# Patient Record
Sex: FEMALE | Race: BLACK OR AFRICAN AMERICAN | NOT HISPANIC OR LATINO | Employment: FULL TIME | URBAN - METROPOLITAN AREA
[De-identification: names, ages, dates, MRNs, and addresses within clinical notes are randomized per-mention and may not be internally consistent; named-entity substitution may affect disease eponyms.]

---

## 2018-11-24 ENCOUNTER — HOSPITAL ENCOUNTER (EMERGENCY)
Facility: HOSPITAL | Age: 48
Discharge: HOME | End: 2018-11-24
Attending: EMERGENCY MEDICINE
Payer: COMMERCIAL

## 2018-11-24 VITALS
DIASTOLIC BLOOD PRESSURE: 110 MMHG | RESPIRATION RATE: 20 BRPM | SYSTOLIC BLOOD PRESSURE: 189 MMHG | WEIGHT: 172 LBS | OXYGEN SATURATION: 98 % | BODY MASS INDEX: 28.66 KG/M2 | HEIGHT: 65 IN | HEART RATE: 77 BPM | TEMPERATURE: 98 F

## 2018-11-24 DIAGNOSIS — Z76.5 DRUG-SEEKING BEHAVIOR: ICD-10-CM

## 2018-11-24 DIAGNOSIS — M79.669 CALF PAIN: Primary | ICD-10-CM

## 2018-11-24 LAB
ALBUMIN SERPL BCP-MCNC: 3.7 G/DL
ALP SERPL-CCNC: 55 U/L
ALT SERPL W/O P-5'-P-CCNC: 14 U/L
ANION GAP SERPL CALC-SCNC: 6 MMOL/L
AST SERPL-CCNC: 15 U/L
B-HCG UR QL: NEGATIVE
BASOPHILS # BLD AUTO: 0.02 K/UL
BASOPHILS NFR BLD: 0.2 %
BILIRUB SERPL-MCNC: 0.3 MG/DL
BUN SERPL-MCNC: 6 MG/DL
CALCIUM SERPL-MCNC: 9.3 MG/DL
CHLORIDE SERPL-SCNC: 107 MMOL/L
CO2 SERPL-SCNC: 25 MMOL/L
CREAT SERPL-MCNC: 0.8 MG/DL
CTP QC/QA: YES
DIFFERENTIAL METHOD: NORMAL
EOSINOPHIL # BLD AUTO: 0.1 K/UL
EOSINOPHIL NFR BLD: 0.6 %
ERYTHROCYTE [DISTWIDTH] IN BLOOD BY AUTOMATED COUNT: 14.2 %
EST. GFR  (AFRICAN AMERICAN): >60 ML/MIN/1.73 M^2
EST. GFR  (NON AFRICAN AMERICAN): >60 ML/MIN/1.73 M^2
GLUCOSE SERPL-MCNC: 96 MG/DL
HCT VFR BLD AUTO: 39.1 %
HGB BLD-MCNC: 12.8 G/DL
LYMPHOCYTES # BLD AUTO: 1.8 K/UL
LYMPHOCYTES NFR BLD: 22 %
MCH RBC QN AUTO: 27.5 PG
MCHC RBC AUTO-ENTMCNC: 32.7 G/DL
MCV RBC AUTO: 84 FL
MONOCYTES # BLD AUTO: 0.6 K/UL
MONOCYTES NFR BLD: 7.3 %
NEUTROPHILS # BLD AUTO: 5.9 K/UL
NEUTROPHILS NFR BLD: 69.9 %
PLATELET # BLD AUTO: 274 K/UL
PMV BLD AUTO: 10.4 FL
POTASSIUM SERPL-SCNC: 3.7 MMOL/L
PROT SERPL-MCNC: 7.3 G/DL
RBC # BLD AUTO: 4.66 M/UL
SODIUM SERPL-SCNC: 138 MMOL/L
WBC # BLD AUTO: 8.37 K/UL

## 2018-11-24 PROCEDURE — 80053 COMPREHEN METABOLIC PANEL: CPT

## 2018-11-24 PROCEDURE — 85025 COMPLETE CBC W/AUTO DIFF WBC: CPT

## 2018-11-24 PROCEDURE — 81025 URINE PREGNANCY TEST: CPT | Performed by: PHYSICIAN ASSISTANT

## 2018-11-24 PROCEDURE — 25000003 PHARM REV CODE 250: Performed by: PHYSICIAN ASSISTANT

## 2018-11-24 PROCEDURE — 99284 EMERGENCY DEPT VISIT MOD MDM: CPT | Mod: 25

## 2018-11-24 RX ORDER — OLMESARTAN MEDOXOMIL 20 MG/1
20 TABLET ORAL DAILY
COMMUNITY

## 2018-11-24 RX ORDER — AMLODIPINE BESYLATE 10 MG/1
10 TABLET ORAL DAILY
COMMUNITY

## 2018-11-24 RX ORDER — ACETAMINOPHEN 500 MG
500 TABLET ORAL EVERY 4 HOURS PRN
Qty: 20 TABLET | Refills: 0 | Status: SHIPPED | OUTPATIENT
Start: 2018-11-24 | End: 2018-11-29

## 2018-11-24 RX ORDER — HYDROCODONE BITARTRATE AND ACETAMINOPHEN 5; 325 MG/1; MG/1
1 TABLET ORAL
Status: COMPLETED | OUTPATIENT
Start: 2018-11-24 | End: 2018-11-24

## 2018-11-24 RX ORDER — LIDOCAINE 50 MG/G
1 PATCH TOPICAL
Status: DISCONTINUED | OUTPATIENT
Start: 2018-11-24 | End: 2018-11-24 | Stop reason: HOSPADM

## 2018-11-24 RX ORDER — LIDOCAINE 50 MG/G
1 PATCH TOPICAL DAILY
Qty: 15 PATCH | Refills: 0 | Status: SHIPPED | OUTPATIENT
Start: 2018-11-24 | End: 2018-12-09

## 2018-11-24 RX ORDER — ACETAMINOPHEN AND CODEINE PHOSPHATE 300; 30 MG/1; MG/1
1 TABLET ORAL EVERY 12 HOURS PRN
Qty: 2 TABLET | Refills: 0 | Status: SHIPPED | OUTPATIENT
Start: 2018-11-24 | End: 2018-11-25

## 2018-11-24 RX ADMIN — LIDOCAINE 1 PATCH: 50 PATCH TOPICAL at 08:11

## 2018-11-24 RX ADMIN — HYDROCODONE BITARTRATE AND ACETAMINOPHEN 1 TABLET: 5; 325 TABLET ORAL at 10:11

## 2018-11-24 NOTE — ED PROVIDER NOTES
"Encounter Date: 11/24/2018    SCRIBE #1 NOTE: I, Treva Lopez, am scribing for, and in the presence of,  Haylee Wolf PA-C. I have scribed the following portions of the note - Other sections scribed: HPI and ROS.       History     Chief Complaint   Patient presents with    Leg Pain     pt here for left leg pain after injury in July 2018; developed PE, on blood thinners, unable to have additional sx. pt here today for pain to leg, localized to calf.      CC: Leg Pain    HPI: This 48 y.o. Female, with a medical history of anxiety, depression, heart murmur, hypertension and pulmonary thromboembolism, presents to the ED c/o left leg (calf, ankle and knee) pain. Pt reports that she began seeing an orthopedist on 9/25/18, s/p stepping in a hole while in the parking lot of her job in July 2018. She states that she had an MRI preformed and was diagnosed with a torn lateral meniscus to the left leg as well as a partially torn ligament in the left ankle. Pt reports that she subsequently underwent surgery (scope) to the left knee on 10/19/18, but states that she later noticed bruising to the leg, prompting her to visit an ED (in [place]) 3x days later where she was diagnosed with a pulmonary embolism. She reports that she has since been on Xarelto for treatment, noting that she will finish the last dose of the trial pack today. Pt reports that she traveled to this area from [place] last Saturday (11/17/18) for the Thanksgiving holidays and states that yesterday she began to experience an intermittent "cramping" pain to the left calf. She adds that she began to experience "a little" swelling to the left calf and posterior ankle as well as intermittent shortness of breath (attributes to possible anxiety) today. Symptoms are acute and severe (8/10). Pt reports taking Tylenol at 9:00 pm last night for treatment without any relief. Pt denies fever, chills, chest pain, diarrhea and wounds. She also denies a history of cancer " or varicose veins. No other associated symptoms.           The history is provided by the patient. No  was used.     Review of patient's allergies indicates:  No Known Allergies  Past Medical History:   Diagnosis Date    Anxiety     Blood transfusion     Depression     Dry eyes     Heart murmur     Hypertension     Myalgia and myositis 12/31/2012    PE (pulmonary thromboembolism)      Past Surgical History:   Procedure Laterality Date    COSMETIC SURGERY  2005    liposuction    KNEE ARTHROSCOPY W/ MENISCAL REPAIR Left     NASAL SEPTUM SURGERY  1991    TUBAL LIGATION       Family History   Problem Relation Age of Onset    COPD Mother     Diabetes Mellitus Mother     Hypertension Mother     Osteoarthritis Mother     Stroke Mother     Hypertension Father     Hypertension Sister     Heart disease Maternal Grandmother     Lupus Neg Hx     Rheum arthritis Neg Hx      Social History     Tobacco Use    Smoking status: Never Smoker    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   HENT: Negative for sore throat.    Respiratory: Positive for shortness of breath.    Cardiovascular: Positive for leg swelling (left calf). Negative for chest pain.   Gastrointestinal: Negative for nausea.   Genitourinary: Negative for dysuria.   Musculoskeletal: Positive for arthralgias (left; left ankle) and joint swelling (posterior ankle). Negative for back pain.        (+) left calf pain   Skin: Negative for rash and wound.   Neurological: Negative for weakness.       Physical Exam     Initial Vitals [11/24/18 0707]   BP Pulse Resp Temp SpO2   (!) 172/96 98 18 98.4 °F (36.9 °C) 100 %      MAP       --         Physical Exam    Nursing note and vitals reviewed.  Constitutional: She appears well-developed and well-nourished.   HENT:   Head:  Normocephalic.   Right Ear: External ear normal.   Left Ear: External ear normal.   Eyes: Conjunctivae are normal.   Cardiovascular: Normal rate and regular rhythm. Exam reveals no gallop and no friction rub.    No murmur heard.  Pulses:       Dorsalis pedis pulses are 2+ on the right side, and 2+ on the left side.   Pulmonary/Chest: Breath sounds normal. She has no wheezes. She has no rhonchi. She has no rales.   Abdominal: There is no tenderness at McBurney's point and negative Mascorro's sign.   Musculoskeletal: Normal range of motion.   TTP to L calf with no appreciated swelling or erythema. No medial thigh, popliteal, ankle or foot TTP. Mild L hip TTP (chronic). Pt ambulatory with walking boot.    Lymphadenopathy:     She has no cervical adenopathy.   Neurological: She is alert. She has normal strength. No sensory deficit.   Skin: Skin is warm and dry. No rash noted.   Psychiatric: She has a normal mood and affect.         ED Course   Procedures  Labs Reviewed   COMPREHENSIVE METABOLIC PANEL - Abnormal; Notable for the following components:       Result Value    Anion Gap 6 (*)     All other components within normal limits   CBC W/ AUTO DIFFERENTIAL   POCT URINE PREGNANCY          Imaging Results          US Lower Extremity Veins Left (Final result)  Result time 11/24/18 09:37:43    Final result by Patrick Hernandez MD (11/24/18 09:37:43)                 Impression:      No evidence of deep venous thrombosis in the left lower extremity.      Electronically signed by: Patrick Hernandez MD  Date:    11/24/2018  Time:    09:37             Narrative:    EXAMINATION:  US LOWER EXTREMITY VEINS LEFT    CLINICAL HISTORY:  Pain in unspecified lower leg    TECHNIQUE:  Duplex and color flow Doppler evaluation and graded compression of the left lower extremity veins was performed.    COMPARISON:  None    FINDINGS:  Left thigh veins: The common femoral, femoral, popliteal, upper greater saphenous, and deep femoral veins  are patent and free of thrombus. The veins are normally compressible and have normal phasic flow and augmentation response.    Left calf veins: The visualized calf veins are patent.    Miscellaneous: None                                 Medical Decision Making:   Initial Assessment:   48 year female visiting from [facility] with history of bilateral pulmonary embolism on Xarelto presenting for evaluation of atraumatic left calf cramping that began yesterday evening.  Patient reports initial injury to left knee and left ankle in June 2018 with MRI 9/25/18 at Orthopedist in [place]  for lateral meniscal tear and partially torn ligaments of ankle.  She had operative repair and arthroscopy of the left knee on October 19th and was diagnosed with bilateral PE use October 23rd.  Patient is compliant with Xarelto but states she is completing her 21 day pack this week and needs additional prescription for Xarelto because she did not contact her physician. The patient had some lower extremity swelling following the operation, but states that current swelling is not similar and is worse.  Patient is ambulatory in walking boot and had recent long travel 6 hr, 1 week ago [place] to [place]; states that she stopped every hour to walk.  She also reports shortness of breath intermittently since yesterday evening.  She denies chest pain or shortness of breath at this time in attributes symptoms to anxiety. Denies new trauma. Not tachycardic or hypoxic. Exam above.  No neurovascular deficits. UPT negative.  Labs unremarkable.  Ultrasound of the left lower extremity negative.  Think this is likely musculoskeletal pain.  Lidoderm applied in the ED.    Prior to discharge, pt reports pain is worse and she is now able to obtain transportation. norco ordered. Pt reports pain significantly improved after norco. She is requesting narcotics at discharge due to severe pain. VALERIE Khoury witnessing encounter. Discussed with pt that she will  "need to follow up with her Orthopedist that has already performed MRI and arthroscopy regarding pain management. Pt states that she was prescribed narcotics but did not bring them as she was pain free prior to the past day. Pt states "I just need something until I can get home.. tylenol 3s.. something." Pt is leaving tomorrow to go  Back to [place] and was given prescription for 2, Tylenol #3s and at this time became angry and started yelling stating that she is going to file a complaint because that is not enough. I am unsure of what I can do to further appease the pt at this time. Doubt emergent or life threatening etiology of symptoms and instructed to begin taking Xarelto as prescribed and follow up with ortho for further evaluation and management. Return to ER for worsening symptoms or as needed. Discussed pt with Dr. Fuentes who agrees with assessment and plan.             Scribe Attestation:   Scribe #1: I performed the above scribed service and the documentation accurately describes the services I performed. I attest to the accuracy of the note.    Attending Attestation:           Physician Attestation for Scribe:  Physician Attestation Statement for Scribe #1: I, Haylee Wolf PA-C, reviewed documentation, as scribed by Treva Lopez in my presence, and it is both accurate and complete.                    Clinical Impression:   The primary encounter diagnosis was Calf pain. A diagnosis of Drug-seeking behavior was also pertinent to this visit.                             Haylee Wolf PA-C  11/24/18 1245    "

## 2018-11-24 NOTE — ED TRIAGE NOTES
Patient reports left leg pain that began yesterday and swelling to left ankle that she noticed yesterday.  Patient reports a hx of a PE and she is afraid and wants to make sure nothing is wrong.  Patient reports being on xarelto at this time.

## 2018-11-24 NOTE — DISCHARGE INSTRUCTIONS
Take Tylenol and apply Lidoderm patch as prescribed.     Follow up with primary care and your Orthopedic doctor for further evaluation and management in 1-2 days. Return to ER for worsening symptoms or as needed.